# Patient Record
Sex: FEMALE | Race: BLACK OR AFRICAN AMERICAN | URBAN - METROPOLITAN AREA
[De-identification: names, ages, dates, MRNs, and addresses within clinical notes are randomized per-mention and may not be internally consistent; named-entity substitution may affect disease eponyms.]

---

## 2020-04-01 ENCOUNTER — EMERGENCY (EMERGENCY)
Facility: HOSPITAL | Age: 35
LOS: 1 days | Discharge: HOME | End: 2020-04-01
Attending: EMERGENCY MEDICINE | Admitting: EMERGENCY MEDICINE
Payer: COMMERCIAL

## 2020-04-01 VITALS
SYSTOLIC BLOOD PRESSURE: 123 MMHG | DIASTOLIC BLOOD PRESSURE: 85 MMHG | OXYGEN SATURATION: 98 % | TEMPERATURE: 99 F | RESPIRATION RATE: 17 BRPM | HEIGHT: 68 IN | WEIGHT: 207.9 LBS | HEART RATE: 107 BPM

## 2020-04-01 DIAGNOSIS — R50.9 FEVER, UNSPECIFIED: ICD-10-CM

## 2020-04-01 DIAGNOSIS — Z98.891 HISTORY OF UTERINE SCAR FROM PREVIOUS SURGERY: Chronic | ICD-10-CM

## 2020-04-01 PROCEDURE — 87635 SARS-COV-2 COVID-19 AMP PRB: CPT

## 2020-04-01 PROCEDURE — 99283 EMERGENCY DEPT VISIT LOW MDM: CPT

## 2020-04-01 PROCEDURE — 99284 EMERGENCY DEPT VISIT MOD MDM: CPT

## 2020-04-01 NOTE — ED ADULT TRIAGE NOTE - CHIEF COMPLAINT QUOTE
Pt c/o fever with body aches/trouble breathing since Friday, not tested, stated started on Azithromycin and Cephalexin yesterday by MD friend, pt is an RN

## 2020-04-01 NOTE — ED PROVIDER NOTE - PATIENT PORTAL LINK FT
You can access the FollowMyHealth Patient Portal offered by [facility] by registering at the following website: [email or web address]. By joining [employer]’s FollowMyHealth portal, you will also be able to view your health information using other applications (apps) compatible with our system.

## 2020-04-01 NOTE — ED PROVIDER NOTE - CLINICAL SUMMARY MEDICAL DECISION MAKING FREE TEXT BOX
Viral/Flu like illness. Well appearing. Lungs clear on examination. No recent travel or sick contact.   Priority 3 per COVID-19 guidelines. COVID testing sent. Given viral illness instructions and strict return precautions-Worsening, continued or ANY new concerning symptoms return to the emergency department. Recommended home quarantine for 14 days. All questions answered. Stable for d/c. Work note provided.

## 2020-04-01 NOTE — ED ADULT NURSE NOTE - OBJECTIVE STATEMENT
Patient presents to the ED alert and oriented x3 with complaints of body aches, fever, and cough since friday.

## 2020-04-01 NOTE — ED PROVIDER NOTE - ATTENDING CONTRIBUTION TO CARE
Will with ASHELY Aguilar. Viral/Flu like illness. Well appearing. Lungs clear on examination. No recent travel or sick contact.   Priority 3 per COVID-19 guidelines. COVID testing sent. Given viral illness instructions and strict return precautions-Worsening, continued or ANY new concerning symptoms return to the emergency department. Recommended home quarantine for 14 days. All questions answered. Stable for d/c. Work note provided.  I performed a face to face bedside interview with patient regarding history of present illness, review of symptoms and past medical history. I completed an independent physical exam.  I have discussed the patient's plan of care with Physician Assistant (PA). I agree with note as stated above, having amended the EMR as needed to reflect my findings.   This includes History of Present Illness, HIV, Past Medical/Surgical/Family/Social History, Allergies and Home Medications, Review of Systems, Physical Exam, and any Progress Notes during the time I functioned as the attending physician for this patient.

## 2020-04-01 NOTE — ED PROVIDER NOTE - OBJECTIVE STATEMENT
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  Has flu like syndrome. They are concerned about the coronavirus amidst the pandemic.   mild body aches, subjective fever, occasional cough. no recent travel.  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  Has flu like syndrome x 5 days.  They are concerned about the coronavirus amidst the pandemic.   mild body aches, subjective fever, occasional cough. no recent travel.  + sick contacts while working

## 2020-04-01 NOTE — ED PROVIDER NOTE - NSFOLLOWUPINSTRUCTIONS_ED_ALL_ED_FT
Rest, increase your fluids.   Take Tylenol 650mg every 4-6 hours as needed for temp >99.9  See attached for COVID-19 info / fact sheet.   See work note and fact sheet.   Please STAY HOME and quarantine yourself until we get your results back.   We sent an RVP and COVID which takes up to 5-7 days. We will contact you if there are any findings. If positive you will have to stay home for 14 days.   Worsening, continued or ANY new concerning symptoms return to the emergency department.   We also included your information in the [facility] Database.

## 2020-04-02 LAB — SARS-COV-2 RNA SPEC QL NAA+PROBE: DETECTED
